# Patient Record
Sex: FEMALE | Race: OTHER | NOT HISPANIC OR LATINO | URBAN - METROPOLITAN AREA
[De-identification: names, ages, dates, MRNs, and addresses within clinical notes are randomized per-mention and may not be internally consistent; named-entity substitution may affect disease eponyms.]

---

## 2018-02-26 ENCOUNTER — EMERGENCY (EMERGENCY)
Facility: HOSPITAL | Age: 28
LOS: 1 days | Discharge: HOME | End: 2018-02-26
Attending: EMERGENCY MEDICINE | Admitting: EMERGENCY MEDICINE
Payer: COMMERCIAL

## 2018-02-26 VITALS
HEART RATE: 72 BPM | RESPIRATION RATE: 16 BRPM | TEMPERATURE: 98 F | DIASTOLIC BLOOD PRESSURE: 59 MMHG | SYSTOLIC BLOOD PRESSURE: 98 MMHG | OXYGEN SATURATION: 100 %

## 2018-02-26 VITALS
OXYGEN SATURATION: 100 % | RESPIRATION RATE: 17 BRPM | DIASTOLIC BLOOD PRESSURE: 79 MMHG | WEIGHT: 125 LBS | SYSTOLIC BLOOD PRESSURE: 108 MMHG | HEIGHT: 61 IN | HEART RATE: 83 BPM | TEMPERATURE: 98 F

## 2018-02-26 DIAGNOSIS — R42 DIZZINESS AND GIDDINESS: ICD-10-CM

## 2018-02-26 DIAGNOSIS — Z98.82 BREAST IMPLANT STATUS: Chronic | ICD-10-CM

## 2018-02-26 LAB
ALBUMIN SERPL ELPH-MCNC: 4.1 G/DL — SIGNIFICANT CHANGE UP (ref 3.3–5)
ALP SERPL-CCNC: 59 U/L — SIGNIFICANT CHANGE UP (ref 40–120)
ALT FLD-CCNC: 10 U/L RC — SIGNIFICANT CHANGE UP (ref 10–45)
ANION GAP SERPL CALC-SCNC: 8 MMOL/L — SIGNIFICANT CHANGE UP (ref 5–17)
APPEARANCE UR: ABNORMAL
AST SERPL-CCNC: 17 U/L — SIGNIFICANT CHANGE UP (ref 10–40)
BASOPHILS # BLD AUTO: 0 K/UL — SIGNIFICANT CHANGE UP (ref 0–0.2)
BASOPHILS NFR BLD AUTO: 0.5 % — SIGNIFICANT CHANGE UP (ref 0–2)
BILIRUB SERPL-MCNC: 0.2 MG/DL — SIGNIFICANT CHANGE UP (ref 0.2–1.2)
BILIRUB UR-MCNC: NEGATIVE — SIGNIFICANT CHANGE UP
BUN SERPL-MCNC: 14 MG/DL — SIGNIFICANT CHANGE UP (ref 7–23)
CALCIUM SERPL-MCNC: 9.1 MG/DL — SIGNIFICANT CHANGE UP (ref 8.4–10.5)
CHLORIDE SERPL-SCNC: 103 MMOL/L — SIGNIFICANT CHANGE UP (ref 96–108)
CO2 SERPL-SCNC: 28 MMOL/L — SIGNIFICANT CHANGE UP (ref 22–31)
COLOR SPEC: YELLOW — SIGNIFICANT CHANGE UP
CREAT SERPL-MCNC: 0.75 MG/DL — SIGNIFICANT CHANGE UP (ref 0.5–1.3)
DIFF PNL FLD: ABNORMAL
EOSINOPHIL # BLD AUTO: 0.4 K/UL — SIGNIFICANT CHANGE UP (ref 0–0.5)
EOSINOPHIL NFR BLD AUTO: 5.5 % — SIGNIFICANT CHANGE UP (ref 0–6)
GLUCOSE SERPL-MCNC: 98 MG/DL — SIGNIFICANT CHANGE UP (ref 70–99)
GLUCOSE UR QL: NEGATIVE — SIGNIFICANT CHANGE UP
HCG UR QL: NEGATIVE — SIGNIFICANT CHANGE UP
HCT VFR BLD CALC: 40.9 % — SIGNIFICANT CHANGE UP (ref 34.5–45)
HGB BLD-MCNC: 13.6 G/DL — SIGNIFICANT CHANGE UP (ref 11.5–15.5)
HIV 1 & 2 AB SERPL IA.RAPID: SIGNIFICANT CHANGE UP
KETONES UR-MCNC: NEGATIVE — SIGNIFICANT CHANGE UP
LEUKOCYTE ESTERASE UR-ACNC: ABNORMAL
LYMPHOCYTES # BLD AUTO: 2.7 K/UL — SIGNIFICANT CHANGE UP (ref 1–3.3)
LYMPHOCYTES # BLD AUTO: 33.9 % — SIGNIFICANT CHANGE UP (ref 13–44)
MAGNESIUM SERPL-MCNC: 1.9 MG/DL — SIGNIFICANT CHANGE UP (ref 1.6–2.6)
MCHC RBC-ENTMCNC: 31.4 PG — SIGNIFICANT CHANGE UP (ref 27–34)
MCHC RBC-ENTMCNC: 33.3 GM/DL — SIGNIFICANT CHANGE UP (ref 32–36)
MCV RBC AUTO: 94.3 FL — SIGNIFICANT CHANGE UP (ref 80–100)
MONOCYTES # BLD AUTO: 0.5 K/UL — SIGNIFICANT CHANGE UP (ref 0–0.9)
MONOCYTES NFR BLD AUTO: 6.5 % — SIGNIFICANT CHANGE UP (ref 2–14)
NEUTROPHILS # BLD AUTO: 4.3 K/UL — SIGNIFICANT CHANGE UP (ref 1.8–7.4)
NEUTROPHILS NFR BLD AUTO: 53.5 % — SIGNIFICANT CHANGE UP (ref 43–77)
NITRITE UR-MCNC: NEGATIVE — SIGNIFICANT CHANGE UP
PH UR: 6.5 — SIGNIFICANT CHANGE UP (ref 5–8)
PHOSPHATE SERPL-MCNC: 3.3 MG/DL — SIGNIFICANT CHANGE UP (ref 2.5–4.5)
PLATELET # BLD AUTO: 359 K/UL — SIGNIFICANT CHANGE UP (ref 150–400)
POTASSIUM SERPL-MCNC: 4.1 MMOL/L — SIGNIFICANT CHANGE UP (ref 3.5–5.3)
POTASSIUM SERPL-SCNC: 4.1 MMOL/L — SIGNIFICANT CHANGE UP (ref 3.5–5.3)
PROT SERPL-MCNC: 7.6 G/DL — SIGNIFICANT CHANGE UP (ref 6–8.3)
PROT UR-MCNC: SIGNIFICANT CHANGE UP
RBC # BLD: 4.34 M/UL — SIGNIFICANT CHANGE UP (ref 3.8–5.2)
RBC # FLD: 10.8 % — SIGNIFICANT CHANGE UP (ref 10.3–14.5)
RBC CASTS # UR COMP ASSIST: >50 /HPF (ref 0–2)
SODIUM SERPL-SCNC: 139 MMOL/L — SIGNIFICANT CHANGE UP (ref 135–145)
SP GR SPEC: 1.02 — SIGNIFICANT CHANGE UP (ref 1.01–1.02)
UROBILINOGEN FLD QL: NEGATIVE — SIGNIFICANT CHANGE UP
WBC # BLD: 8.1 K/UL — SIGNIFICANT CHANGE UP (ref 3.8–10.5)
WBC # FLD AUTO: 8.1 K/UL — SIGNIFICANT CHANGE UP (ref 3.8–10.5)
WBC UR QL: SIGNIFICANT CHANGE UP /HPF (ref 0–5)

## 2018-02-26 PROCEDURE — 81025 URINE PREGNANCY TEST: CPT

## 2018-02-26 PROCEDURE — 99284 EMERGENCY DEPT VISIT MOD MDM: CPT | Mod: 25

## 2018-02-26 PROCEDURE — 84100 ASSAY OF PHOSPHORUS: CPT

## 2018-02-26 PROCEDURE — 85027 COMPLETE CBC AUTOMATED: CPT

## 2018-02-26 PROCEDURE — 86703 HIV-1/HIV-2 1 RESULT ANTBDY: CPT

## 2018-02-26 PROCEDURE — 99284 EMERGENCY DEPT VISIT MOD MDM: CPT

## 2018-02-26 PROCEDURE — 81001 URINALYSIS AUTO W/SCOPE: CPT

## 2018-02-26 PROCEDURE — 83735 ASSAY OF MAGNESIUM: CPT

## 2018-02-26 PROCEDURE — 96374 THER/PROPH/DIAG INJ IV PUSH: CPT

## 2018-02-26 PROCEDURE — 80053 COMPREHEN METABOLIC PANEL: CPT

## 2018-02-26 RX ORDER — ONDANSETRON 8 MG/1
4 TABLET, FILM COATED ORAL ONCE
Qty: 0 | Refills: 0 | Status: COMPLETED | OUTPATIENT
Start: 2018-02-26 | End: 2018-02-26

## 2018-02-26 RX ORDER — FAMOTIDINE 10 MG/ML
1 INJECTION INTRAVENOUS
Qty: 0 | Refills: 0 | COMMUNITY

## 2018-02-26 RX ORDER — SODIUM CHLORIDE 9 MG/ML
1000 INJECTION INTRAMUSCULAR; INTRAVENOUS; SUBCUTANEOUS ONCE
Qty: 0 | Refills: 0 | Status: COMPLETED | OUTPATIENT
Start: 2018-02-26 | End: 2018-02-26

## 2018-02-26 RX ORDER — DIAZEPAM 5 MG
5 TABLET ORAL ONCE
Qty: 0 | Refills: 0 | Status: DISCONTINUED | OUTPATIENT
Start: 2018-02-26 | End: 2018-02-26

## 2018-02-26 RX ORDER — OFLOXACIN OTIC SOLUTION 3 MG/ML
10 SOLUTION/ DROPS AURICULAR (OTIC) ONCE
Qty: 0 | Refills: 0 | Status: COMPLETED | OUTPATIENT
Start: 2018-02-26 | End: 2018-02-26

## 2018-02-26 RX ORDER — DIAZEPAM 5 MG
1 TABLET ORAL
Qty: 8 | Refills: 0 | OUTPATIENT
Start: 2018-02-26 | End: 2018-03-01

## 2018-02-26 RX ADMIN — OFLOXACIN OTIC SOLUTION 10 DROP(S): 3 SOLUTION/ DROPS AURICULAR (OTIC) at 22:20

## 2018-02-26 RX ADMIN — Medication 5 MILLIGRAM(S): at 18:42

## 2018-02-26 RX ADMIN — SODIUM CHLORIDE 1000 MILLILITER(S): 9 INJECTION INTRAMUSCULAR; INTRAVENOUS; SUBCUTANEOUS at 18:43

## 2018-02-26 RX ADMIN — ONDANSETRON 4 MILLIGRAM(S): 8 TABLET, FILM COATED ORAL at 18:42

## 2018-02-26 NOTE — ED PROVIDER NOTE - OBJECTIVE STATEMENT
27F w/ pmh gastritis, constipation and recent dx of vertigo presents today with worsening dizziness, room spinning. Started 8 days ago. Last wed started vomiting and went to [facility] and dx with vertigo, sent home with meclizine and promethazine, which have not helped. Have been vomiting since wednesday multiple times a day. Vomiting is food, no blood or bile. 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 [facility] and told to go to ED for eval.     Of note, recent travel to [place] from 2/14 and 2/18. Felt pop in R ear during snorkeling at that time.     ROS neg for headaches, vision changes, tinnitus, fevers, night sweats, SOB, chest pain, abd pain, urinary symptoms.

## 2018-02-26 NOTE — ED ADULT NURSE NOTE - CHPI ED SYMPTOMS NEG
no weakness/no numbness/no change in level of consciousness/no blurred vision/no confusion/no fever/no loss of consciousness

## 2018-02-26 NOTE — ED ADULT NURSE NOTE - OBJECTIVE STATEMENT
Female 27 years old came in for dizziness. Pt reports she's been dizzy associated with nausea and vomiting for a week, went to Urgent care center and was given Meclizine and Promethazine. Pt went back to the Urgent care center for the second time after a few days because of worsening dizziness and was told to go to ER for MRI. Denies fever, chills, cough, sob, chest pain and urinary symptoms. PERRL. Denies sensitivity to light. Made comfortable. Will monitor. Female 27 years old came in for dizziness. Pt reports she's been dizzy associated with nausea and vomiting for a week, went to Urgent care center was diagnosed with Vertigo and was given Meclizine and Promethazine. Pt went back to the Urgent care center for the second time after a few days because of worsening dizziness and vomiting and was told to go to ER for MRI. Vomiting food, no bile nor blood. Denies fever, chills, cough, sob, chest pain and urinary symptoms. PERRL. Denies sensitivity to light. Pt recently came back from [place], and reports she heard a pop on her right ear while snorkeling. Made comfortable. Will monitor.

## 2018-02-26 NOTE — CONSULT NOTE ADULT - ASSESSMENT
26 yo F c/o right ear fullness, right muffled hearing and vertigo with associated n/v since she felt her ear 'pop' while snorkeling in [place] about two weeks ago. Pt was given meclizine and promethezine at urgent care in which she feels is making her more dizzy; feels better with lying down. Small TM perf, with bulging and erythema.

## 2018-02-26 NOTE — CONSULT NOTE ADULT - SUBJECTIVE AND OBJECTIVE BOX
Neurology Consult    Reason for consult: Vertigo    HPI: Patient is a 27 year old female presenting to the ED with vertigo. Patient states she went to [place] and went snorkeling 2/16. She went really deep in the water and heard her right ear pop. The next day she took a flight back to [place]. 2 days later she started having sensation of room spinning which was intermittent. Symptoms did not go away so she went to [facility] and she was given meclizine and told she had vertigo. She started developing ear pain, nausea, vomiting and on Friday she went again and was again told it was vertigo. States meclizine does not provide full relief. Denies changes in vision, double vision.    REVIEW OF SYSTEMS:  Constitutional: No fever, chills, fatigue, weakness.  Eyes: No eye pain, visual disturbances, or discharge.  ENT:  Vertigo, right ear pain. No sinus or throat pain.  Neck: No pain or stiffness.  Respiratory: No cough, dyspnea, wheezing.  Cardiovascular: No chest pain, palpitations.  Gastrointestinal: No abdominal pain. No nausea, vomiting, diarrhea, or constipation.   Genitourinary: No dysuria, frequency, hematuria or incontinence.  Neurological: No headaches, lightheadedness, vertigo, numbness or tremors.  Psychiatric: No depression, anxiety, mood swings, or difficulty sleeping.  Musculoskeletal: No joint pain or swelling. No muscle, back, or extremity pain.  Skin: No itching, burning, rashes or lesions.   Lymph Nodes: No enlarged glands  Endocrine: No heat or cold intolerance, no hair loss.  Allergy and Immunologic: No hives or eczema.    MEDICATIONS  Meclizine 25 TID    PMH: Gastritis  Pneumonia  Vertigo    PSH: H/O breast augmentation    FAMILY HISTORY:  No pertinent family history in first degree relatives  No history of dementia, strokes, or seizures     SOCIAL HISTORY:  No history of tobacco or alcohol use     Allergies  No Known Allergies    Vital Signs Last 24 Hrs  T(C): 36.6 (26 Feb 2018 17:14), Max: 36.6 (26 Feb 2018 17:14)  T(F): 97.8 (26 Feb 2018 17:14), Max: 97.8 (26 Feb 2018 17:14)  HR: 83 (26 Feb 2018 17:14) (83 - 83)  BP: 108/79 (26 Feb 2018 17:14) (108/79 - 108/79)  RR: 17 (26 Feb 2018 17:14) (17 - 17)  SpO2: 100% (26 Feb 2018 17:14) (100% - 100%)    Neurological Examination:    Mental Status: Patient is alert, awake, oriented X3. Patient is fluent, no dysarthria, no aphasia. Follows commands well and able to answer questions appropriately. Mood and affect normal.    Cranial Nerves: PERRL, EOMI, visual field intact, V1-V3 intact, no gross facial asymmetry, tongue/uvula midline. No nystagmus  Head thrust negative.  Right ear with possible tear on tympanic membrane  Right submandibular tenderness    Motor Exam: No drift  Right upper extremity: 5/5  Left upper extremity: 5/5  Right lower extremity: 5/5  Left lower extremity: 5/5    Normal bulk/tone    Sensory: Intact to light touch bilaterally. No extinction    Coordination: Finger to nose intact bilaterally     Gait: Slow and cautious but steady    Reflexes: Bilateral 2+ Biceps, Brachial, Patellar  Plantar: Down bilateral    GENERAL: No acute distress  HEENT:  Normocephalic, atraumatic  EXTREMITIES: No edema, clubbing, cyanosis  MUSCULOSKELETAL: Normal range of motion  SKIN: No rashes    LABS:  CBC Full  -  ( 26 Feb 2018 18:42 )  WBC Count : 8.1 K/uL  Hemoglobin : 13.6 g/dL  Hematocrit : 40.9 %  Platelet Count - Automated : 359 K/uL  Mean Cell Volume : 94.3 fl  Mean Cell Hemoglobin : 31.4 pg  Mean Cell Hemoglobin Concentration : 33.3 gm/dL  Auto Neutrophil # : 4.3 K/uL  Auto Lymphocyte # : 2.7 K/uL  Auto Monocyte # : 0.5 K/uL  Auto Eosinophil # : 0.4 K/uL  Auto Basophil # : 0.0 K/uL  Auto Neutrophil % : 53.5 %  Auto Lymphocyte % : 33.9 %  Auto Monocyte % : 6.5 %  Auto Eosinophil % : 5.5 %  Auto Basophil % : 0.5 %    02-26    139  |  103  |  14  ----------------------------<  98  4.1   |  28  |  0.75    Ca    9.1      26 Feb 2018 18:42  Phos  3.3     02-26  Mg     1.9     02-26    TPro  7.6  /  Alb  4.1  /  TBili  0.2  /  DBili  x   /  AST  17  /  ALT  10  /  AlkPhos  59  02-26    LIVER FUNCTIONS - ( 26 Feb 2018 18:42 )  Alb: 4.1 g/dL / Pro: 7.6 g/dL / ALK PHOS: 59 U/L / ALT: 10 U/L RC / AST: 17 U/L / GGT: x

## 2018-02-26 NOTE — ED PROVIDER NOTE - MEDICAL DECISION MAKING DETAILS
27F w/ a week of room spinning s/p vacation and snorkeling w/ ear popping. Feels unsteady on her feet. SYmptoms consistent with BPPV however pt not responding to meclazine and promethazine. Given travel hx and pressure changes, certainly can still be peripheral. WIll attempt symptom mgmt with valium, zofran, and fluids. Will likely benefit from imaging of ear and brain. However, may need MRI. Will consult both neuro and ENT prior to deciding on imaging. 27F w/ a week of room spinning s/p vacation and snorkeling w/ ear popping. Feels unsteady on her feet. SYmptoms consistent with BPPV however pt not responding to meclazine and promethazine. Given travel hx and pressure changes, certainly can still be peripheral. Slight TM bulging on the R with small inflammation around the TM. WIll attempt symptom mgmt with valium, zofran, and fluids. Will likely benefit from imaging of ear and brain. However, may need MRI. Will consult both neuro and ENT prior to deciding on imaging.

## 2018-02-26 NOTE — CONSULT NOTE ADULT - SUBJECTIVE AND OBJECTIVE BOX
CC: Dizziness    HPI: Patient is a 27y old female c/o dizziness with associated nausea and vomiting since snorkeling in [place] when she felt her ear 'pop' abouot two weeks ago. Pt first began vomiting this past wednesday and was seen by urgent care and given meclizine and promethezine which she feels hasn't helped, and actually makes her more dizzy. Pt states she feels as if the room is spinning when she stands up and moves around, or when her eyes are closed and feels better while laying still. Pt denies hearing loss or tinnitus, but feel as if her right ear is muffled with fullness. Pt denies otorrhea, ear pain, fever, abdominal pain, sore throat.    PAST MEDICAL & SURGICAL HISTORY:  Gastritis  Pneumonia  Vertigo  H/O breast augmentation    Allergies    No Known Allergies    Intolerances      MEDICATIONS  (STANDING):    MEDICATIONS  (PRN):    Social History: never smoker    ROS: ENT, GI, , CV, Pulm, Neuro, Psych, MS, Heme, Endo, Constitutional; all negative except as noted in HPI    Vital Signs Last 24 Hrs  T(C): 36.6 (26 Feb 2018 17:14), Max: 36.6 (26 Feb 2018 17:14)  T(F): 97.8 (26 Feb 2018 17:14), Max: 97.8 (26 Feb 2018 17:14)  HR: 83 (26 Feb 2018 17:14) (83 - 83)  BP: 108/79 (26 Feb 2018 17:14) (108/79 - 108/79)  BP(mean): --  RR: 17 (26 Feb 2018 17:14) (17 - 17)  SpO2: 100% (26 Feb 2018 17:14) (100% - 100%)                          13.6   8.1   )-----------( 359      ( 26 Feb 2018 18:42 )             40.9    02-26    139  |  103  |  14  ----------------------------<  98  4.1   |  28  |  0.75    Ca    9.1      26 Feb 2018 18:42  Phos  3.3     02-26  Mg     1.9     02-26    TPro  7.6  /  Alb  4.1  /  TBili  0.2  /  DBili  x   /  AST  17  /  ALT  10  /  AlkPhos  59  02-26       PHYSICAL EXAM:  Gen: NAD, well-developed  Head: Normocephalic, Atraumatic  Face: no edema/erythema/fluctuance, parotid glands soft without mass  Eyes: PERRL, EOMI, no scleral injection  Ears: Right - Bulging erythematous TM with small perforation in the center, no drainage, no blood, ear canal clear            Left - ear canal clear, TM intact without effusion  Nose: Nares bilaterally patent, no discharge  Mouth: Mucosa moist, tongue/uvula midline, oropharynx clear  Neck: Flat, supple, no lymphadenopathy, trachea midline, no masses,  R infrauricular tenderness to palpation  Resp: no use of accessory muscles, no stridor  CV: no peripheral edema/cyanosis

## 2018-02-26 NOTE — ED ADULT TRIAGE NOTE - CHIEF COMPLAINT QUOTE
Dizziness/nausea x 8 days, seen at urgent care and dx with vertigo and given meclizine and promethazine with no relief

## 2018-02-26 NOTE — CONSULT NOTE ADULT - ASSESSMENT
27 year old female presenting to the ED with vertigo. Patient states she went to [place] and went snorkeling 2/16. She went really deep in the water and heard her right ear pop. The next day she took a flight back to [place]. 2 days later she started having sensation of room spinning which was intermittent. Symptoms did not go away so she went to [facility] and she was given meclizine and told she had vertigo. She started developing ear pain, nausea, vomiting and on Friday she went again and was again told it was vertigo. States meclizine does not provide full relief.  Neuro exam nonfocal. Possible tear on right tympanic membrane with tenderness to palpation of right submandibular area.    Peripheral vertigo    Recommend:  ENT evaluation  Meclizine 25 PO TID  Valium 5 mg q6h PRN  Reglan  IV fluids  Outpatient follow up with Dr. Miranda [phone]

## 2018-02-26 NOTE — ED PROVIDER NOTE - ATTENDING CONTRIBUTION TO CARE
Dory Cannon MD  27F w/ a week of room spinning s/p vacation and snorkeling w/ ear popping. Feels unsteady on her feet. not responding to meclazine and promethazine; on exam, LAVON, EOMI, Slight TM bulging on the R with small inflammation around the TM, no nystagmus, no ataxia, normal strength. Plan: valium, zofran, and fluids both neuro and ENT.

## 2018-03-01 RX ORDER — OFLOXACIN OTIC SOLUTION 3 MG/ML
10 SOLUTION/ DROPS AURICULAR (OTIC)
Qty: 1 | Refills: 0 | OUTPATIENT
Start: 2018-03-01 | End: 2018-03-10

## 2018-03-01 NOTE — ED POST DISCHARGE NOTE - REASON FOR FOLLOW-UP
Other pt called stating her ear drops were not sent into her pharmacy. reviewed pts chart, d/c note and ENT note recommend ofloxacin drops, rx sent to pts requested pharmacy. - LAUREEN Sadler

## 2019-02-27 ENCOUNTER — EMERGENCY (EMERGENCY)
Facility: HOSPITAL | Age: 29
LOS: 1 days | Discharge: HOME | End: 2019-02-27
Attending: EMERGENCY MEDICINE
Payer: COMMERCIAL

## 2019-02-27 VITALS
DIASTOLIC BLOOD PRESSURE: 75 MMHG | WEIGHT: 121.92 LBS | TEMPERATURE: 98 F | HEART RATE: 87 BPM | SYSTOLIC BLOOD PRESSURE: 113 MMHG | OXYGEN SATURATION: 99 % | RESPIRATION RATE: 18 BRPM

## 2019-02-27 DIAGNOSIS — Z98.82 BREAST IMPLANT STATUS: Chronic | ICD-10-CM

## 2019-02-27 PROCEDURE — 99283 EMERGENCY DEPT VISIT LOW MDM: CPT | Mod: 25

## 2019-02-27 NOTE — ED ADULT NURSE NOTE - OBJECTIVE STATEMENT
29 y/o female presents to ED c/o R ear pain x few days. Pt denies drainage. Saw PCP who didn't find anything of significance. Pt took 2 aleve with no relief. Upon arrival, pt tearful. Denies fever, chills, n/v/d.

## 2019-02-27 NOTE — ED ADULT NURSE NOTE - NSIMPLEMENTINTERV_GEN_ALL_ED
Implemented All Universal Safety Interventions:  [place] to call system. Call bell, personal items and telephone within reach. Instruct patient to call for assistance. Room bathroom lighting operational. Non-slip footwear when patient is off stretcher. Physically safe environment: no spills, clutter or unnecessary equipment. Stretcher in lowest position, wheels locked, appropriate side rails in place.

## 2019-02-28 PROBLEM — J18.9 PNEUMONIA, UNSPECIFIED ORGANISM: Chronic | Status: ACTIVE | Noted: 2018-02-26

## 2019-02-28 PROBLEM — K29.70 GASTRITIS, UNSPECIFIED, WITHOUT BLEEDING: Chronic | Status: ACTIVE | Noted: 2018-02-26

## 2019-02-28 PROBLEM — R42 DIZZINESS AND GIDDINESS: Chronic | Status: ACTIVE | Noted: 2018-02-26

## 2019-02-28 PROCEDURE — 99283 EMERGENCY DEPT VISIT LOW MDM: CPT

## 2019-02-28 RX ORDER — ACETAMINOPHEN 500 MG
650 TABLET ORAL ONCE
Qty: 0 | Refills: 0 | Status: COMPLETED | OUTPATIENT
Start: 2019-02-28 | End: 2019-02-28

## 2019-02-28 RX ADMIN — Medication 1 TABLET(S): at 00:12

## 2019-02-28 RX ADMIN — Medication 650 MILLIGRAM(S): at 00:12

## 2019-02-28 NOTE — ED PROVIDER NOTE - OBJECTIVE STATEMENT
27yo F with pmhx of constipation, vertigo, GERD presents to ER c/o of right ear pain associated with sore throat, nasal congestion and subjective fevers x1week.  Patient reports when ever she "get sick" -congested, gets ear pain, saw her ENT doctor 4days ago who told her she did not have an infection and it was probably GERD symptoms causing pain.  Reports symptoms has been getting worse, reports constant pressure and "clogged sensation" to right ear, worse when swallowing.  Tried taking allieve for pain with minimal relief.  Denies sick contacts, recent travel, trauma to ear, cough, bodyaches, vomiting, visual changes, neck stiffness, 27yo F with pmhx of constipation, vertigo, GERD presents to ER c/o of right ear pain associated with sore throat, nasal congestion and subjective fevers x1week.  Patient reports when ever she "get sick" -congested, gets ear pain, saw her ENT doctor 4days ago who told her she did not have an infection and it was probably GERD symptoms causing pain.  Reports symptoms has been getting worse, reports constant pressure and "clogged sensation" to right ear, worse when swallowing.  Tried taking allieve for pain with minimal relief.  Denies sick contacts, recent travel, trauma to ear, cough, bodyaches, vomiting, visual changes, neck stiffness,      Attending note. Patient was seen in fast track from #3. Agree with the above. Patient's complaint of severe right ear pain without fever or discharge. Patient has pain for approximately one week and was evaluated by ENT. Patient also complains of slight decreased hearing in the right ear. She denies any sore throat, dizziness/vertigo. She reports some nasal congestion, but no cough.

## 2019-02-28 NOTE — ED PROVIDER NOTE - CLINICAL SUMMARY MEDICAL DECISION MAKING FREE TEXT BOX
28yo F c/o of nasal congestion, sore throat ear pain.  imP; right OM, URI  -tylenol and ABX, reassess 26yo F c/o of nasal congestion, sore throat ear pain.  imP; right OM, URI  -tylenol and ABX, reassess      Attending note. Right otitis media. Antibiotics, analgesia, follow up with her ENT in one week.

## 2019-02-28 NOTE — ED PROVIDER NOTE - NSFOLLOWUPINSTRUCTIONS_ED_ALL_ED_FT
Take antibiotics as directed.  Tylenol 650mg every 6-8hours as needed for fevers/pain.  Continue to follow up with ENT.  Return to ER for any worsening or concerning symptoms

## 2019-02-28 NOTE — ED PROVIDER NOTE - CARE PLAN
Principal Discharge DX:	Ear pain Principal Discharge DX:	Other non-recurrent acute nonsuppurative otitis media of right ear

## 2019-02-28 NOTE — ED PROVIDER NOTE - PHYSICAL EXAMINATION
Attending note. Patient is alert and in moderate distress. There is no facial swelling. Oropharynx is normal. Patient has erythema and bulging of the right TM. Tympanic membrane is intact. There is no submandibular or anterior cervical adenopathy.

## 2019-10-12 NOTE — ED PROVIDER NOTE - NS HIV RISK FACTOR YES
Declined Nasal mucosa clear.  Mouth with normal mucosa  Throat has no vesicles, no oropharyngeal exudates and uvula is midline.

## 2020-01-02 ENCOUNTER — EMERGENCY (EMERGENCY)
Facility: HOSPITAL | Age: 30
LOS: 1 days | Discharge: HOME | End: 2020-01-02
Attending: EMERGENCY MEDICINE
Payer: COMMERCIAL

## 2020-01-02 VITALS
TEMPERATURE: 98 F | OXYGEN SATURATION: 100 % | WEIGHT: 123.9 LBS | HEIGHT: 62 IN | SYSTOLIC BLOOD PRESSURE: 113 MMHG | DIASTOLIC BLOOD PRESSURE: 79 MMHG | HEART RATE: 80 BPM | RESPIRATION RATE: 17 BRPM

## 2020-01-02 DIAGNOSIS — Z98.82 BREAST IMPLANT STATUS: Chronic | ICD-10-CM

## 2020-01-02 PROCEDURE — 93010 ELECTROCARDIOGRAM REPORT: CPT

## 2020-01-02 PROCEDURE — 99284 EMERGENCY DEPT VISIT MOD MDM: CPT

## 2020-01-02 NOTE — ED ADULT TRIAGE NOTE - CHIEF COMPLAINT QUOTE
Patient c/o chest pressure and palpitations since Tuesday morning. Patient went to a Urgent care on Tuesday ( EKG and FS done WNL)

## 2020-01-03 VITALS
HEART RATE: 70 BPM | TEMPERATURE: 98 F | DIASTOLIC BLOOD PRESSURE: 73 MMHG | SYSTOLIC BLOOD PRESSURE: 107 MMHG | OXYGEN SATURATION: 99 % | RESPIRATION RATE: 18 BRPM

## 2020-01-03 LAB
ALBUMIN SERPL ELPH-MCNC: 3.9 G/DL — SIGNIFICANT CHANGE UP (ref 3.3–5)
ALP SERPL-CCNC: 48 U/L — SIGNIFICANT CHANGE UP (ref 40–120)
ALT FLD-CCNC: 17 U/L — SIGNIFICANT CHANGE UP (ref 10–45)
ANION GAP SERPL CALC-SCNC: 14 MMOL/L — SIGNIFICANT CHANGE UP (ref 5–17)
AST SERPL-CCNC: 18 U/L — SIGNIFICANT CHANGE UP (ref 10–40)
BASOPHILS # BLD AUTO: 0.06 K/UL — SIGNIFICANT CHANGE UP (ref 0–0.2)
BASOPHILS NFR BLD AUTO: 0.7 % — SIGNIFICANT CHANGE UP (ref 0–2)
BILIRUB SERPL-MCNC: 0.1 MG/DL — LOW (ref 0.2–1.2)
BUN SERPL-MCNC: 16 MG/DL — SIGNIFICANT CHANGE UP (ref 7–23)
CALCIUM SERPL-MCNC: 9.2 MG/DL — SIGNIFICANT CHANGE UP (ref 8.4–10.5)
CHLORIDE SERPL-SCNC: 103 MMOL/L — SIGNIFICANT CHANGE UP (ref 96–108)
CO2 SERPL-SCNC: 22 MMOL/L — SIGNIFICANT CHANGE UP (ref 22–31)
CREAT SERPL-MCNC: 0.6 MG/DL — SIGNIFICANT CHANGE UP (ref 0.5–1.3)
EOSINOPHIL # BLD AUTO: 0.55 K/UL — HIGH (ref 0–0.5)
EOSINOPHIL NFR BLD AUTO: 6.2 % — HIGH (ref 0–6)
GLUCOSE SERPL-MCNC: 105 MG/DL — HIGH (ref 70–99)
HCG SERPL-ACNC: <2 MIU/ML — SIGNIFICANT CHANGE UP
HCT VFR BLD CALC: 38.5 % — SIGNIFICANT CHANGE UP (ref 34.5–45)
HGB BLD-MCNC: 12.7 G/DL — SIGNIFICANT CHANGE UP (ref 11.5–15.5)
IMM GRANULOCYTES NFR BLD AUTO: 0.1 % — SIGNIFICANT CHANGE UP (ref 0–1.5)
LYMPHOCYTES # BLD AUTO: 3.5 K/UL — HIGH (ref 1–3.3)
LYMPHOCYTES # BLD AUTO: 39.5 % — SIGNIFICANT CHANGE UP (ref 13–44)
MCHC RBC-ENTMCNC: 30.8 PG — SIGNIFICANT CHANGE UP (ref 27–34)
MCHC RBC-ENTMCNC: 33 GM/DL — SIGNIFICANT CHANGE UP (ref 32–36)
MCV RBC AUTO: 93.4 FL — SIGNIFICANT CHANGE UP (ref 80–100)
MONOCYTES # BLD AUTO: 0.64 K/UL — SIGNIFICANT CHANGE UP (ref 0–0.9)
MONOCYTES NFR BLD AUTO: 7.2 % — SIGNIFICANT CHANGE UP (ref 2–14)
NEUTROPHILS # BLD AUTO: 4.11 K/UL — SIGNIFICANT CHANGE UP (ref 1.8–7.4)
NEUTROPHILS NFR BLD AUTO: 46.3 % — SIGNIFICANT CHANGE UP (ref 43–77)
NRBC # BLD: 0 /100 WBCS — SIGNIFICANT CHANGE UP (ref 0–0)
PLATELET # BLD AUTO: 325 K/UL — SIGNIFICANT CHANGE UP (ref 150–400)
POTASSIUM SERPL-MCNC: 4 MMOL/L — SIGNIFICANT CHANGE UP (ref 3.5–5.3)
POTASSIUM SERPL-SCNC: 4 MMOL/L — SIGNIFICANT CHANGE UP (ref 3.5–5.3)
PROT SERPL-MCNC: 6.8 G/DL — SIGNIFICANT CHANGE UP (ref 6–8.3)
RBC # BLD: 4.12 M/UL — SIGNIFICANT CHANGE UP (ref 3.8–5.2)
RBC # FLD: 12 % — SIGNIFICANT CHANGE UP (ref 10.3–14.5)
SODIUM SERPL-SCNC: 139 MMOL/L — SIGNIFICANT CHANGE UP (ref 135–145)
WBC # BLD: 8.87 K/UL — SIGNIFICANT CHANGE UP (ref 3.8–10.5)
WBC # FLD AUTO: 8.87 K/UL — SIGNIFICANT CHANGE UP (ref 3.8–10.5)

## 2020-01-03 PROCEDURE — 71045 X-RAY EXAM CHEST 1 VIEW: CPT

## 2020-01-03 PROCEDURE — 85027 COMPLETE CBC AUTOMATED: CPT

## 2020-01-03 PROCEDURE — 71045 X-RAY EXAM CHEST 1 VIEW: CPT | Mod: 26

## 2020-01-03 PROCEDURE — 84702 CHORIONIC GONADOTROPIN TEST: CPT

## 2020-01-03 PROCEDURE — 93005 ELECTROCARDIOGRAM TRACING: CPT

## 2020-01-03 PROCEDURE — 99283 EMERGENCY DEPT VISIT LOW MDM: CPT | Mod: 25

## 2020-01-03 PROCEDURE — 80053 COMPREHEN METABOLIC PANEL: CPT

## 2020-01-03 RX ORDER — IBUPROFEN 200 MG
400 TABLET ORAL ONCE
Refills: 0 | Status: COMPLETED | OUTPATIENT
Start: 2020-01-03 | End: 2020-01-03

## 2020-01-03 RX ADMIN — Medication 400 MILLIGRAM(S): at 04:18

## 2020-01-03 NOTE — ED PROVIDER NOTE - PATIENT PORTAL LINK FT
You can access the FollowMyHealth Patient Portal offered by [facility] by registering at the following website: [email or web address]. By joining [employer]’s FollowMyHealth portal, you will also be able to view your health information using other applications (apps) compatible with our system.

## 2020-01-03 NOTE — ED PROVIDER NOTE - NSFOLLOWUPINSTRUCTIONS_ED_ALL_ED_FT
You came into the hospital for chest pain. It was determined on testing that you do not have a life-threatening cause for your chest pain, although the exact reason why you are experiencing this is unclear.     You may follow up with your outpatient provider in the next 1-2 weeks regarding this matter to see if further testing/imaging is required.    Please return to the emergency department if you feel like you have palpitations that do not get better, with associated light-headedness/dizziness, worsening chest pain that does not resolve with ibuprofen/tylenol, or persistent shortness of breath.    You may take ibuprofen 400mg every 4-6 hours with meals as needed if you continue to have mild-moderate chest pain.

## 2020-01-03 NOTE — ED PROVIDER NOTE - CARE PLAN
Principal Discharge DX:	Chest pain  Assessment and plan of treatment:	Young woman with chest pressure for 3-4 days, with associated lightheadedness/dizziness, will send basic labs, EKG, imaging; ddx includes pericarditis, costochondritis, ACS unlikely, PE unlikely, treat with ibuprofen and re-assess

## 2020-01-03 NOTE — ED PROVIDER NOTE - OBJECTIVE STATEMENT
Pt is an otherwise healthy 29F complaining of palpitations since tuesday. Palpitations lasted for a short period of time and self-resolved. During the episode of palpitations, she had associated light-headedness/dizziness mild chest discomfort/pressure. The chest discomfort was epigastric in nature, radiating upwards, described as a pressure, and continued throughout presentation to ED. She went to urgent care for these symptoms and was told to come into ER for further evaluation.    She otherwise denies any current fever, chills, nausea, vomiting, shortness of breath, abdominal pain, diarrhea, constipation, or dysuria.

## 2020-01-03 NOTE — ED PROVIDER NOTE - ATTENDING CONTRIBUTION TO CARE
CXR neg, labs unremarkable. Pt PERC neg. EKG without acute pathology. no real concern for ACS. pericarditis unlikely. Multiple diagnoses were considered during patient's evaluation today. While exact etiology of symptoms is unclear, there appears to be no emergent process today that would require further emergent or inpatient management. Pt is safe for dc with outpt f/u and return instructions if symptoms worsen.

## 2020-01-03 NOTE — ED PROVIDER NOTE - PHYSICAL EXAMINATION
T(C): 36.6 (01-03-20 @ 05:18), Max: 36.6 (01-02-20 @ 21:46)  HR: 70 (01-03-20 @ 05:18) (70 - 80)  BP: 107/73 (01-03-20 @ 05:18) (107/73 - 113/79)  RR: 18 (01-03-20 @ 05:18) (17 - 18)  SpO2: 99% (01-03-20 @ 05:18) (99% - 100%)    PHYSICAL EXAM:  General: Female laying in bed comfortably  HEENT: NCAT, no conjunctival pallor, no scleral icterus, moist mucous membranes, no oropharyngeal exudates or erythema  Neck: Supple, no lymphadenopathy  Pulmonary: Clear to auscultation bilaterally, no wheezing, rales, or ronchi  Cardiovascular: Regular rate and rhythm, normal S1/S2, no murmurs, rubs, or gallops, no reproducible tenderness on palpation of the sterum  Abdominal Exam: Soft, NTND, no masses, bowel sounds present  Extremities: No edema, pulses 2+ bilaterally in the upper and lower extremities  Neuro: Grossly intact in all extremities  Skin: Warm, dry, no visible jaundice, no rashes

## 2020-01-03 NOTE — ED ADULT NURSE NOTE - OBJECTIVE STATEMENT
29 year old Female, no PMH/PSH. Patient went to Urgent care for palpitations. Comes to ED after palpitations on Tuesday have not subsided. Patient feels shortness of breath despite O2 100% on room air. Complains of sternal chest pain described as pressure, with no radition. Taking a deep breath makes the pain better, nothing makes the pain worse. Patient has not taken any medication for the pain. Patient A&Ox3, breathing spontaneous and unlabored, palpable pulses, able to move all extremities. Denies recent travel or long cars, dizziness, vision changes, nausea, vomiting, diarrhea, fever, cough, chills, urinary symptoms. Patient is on birth control, states there has been no complications with LMP one week ago, cycle as usual for patient. Boyfriend at beside. Bed locked and in lowest position with side rails up for safety.

## 2020-01-03 NOTE — ED PROVIDER NOTE - PLAN OF CARE
Young woman with chest pressure for 3-4 days, with associated lightheadedness/dizziness, will send basic labs, EKG, imaging; ddx includes pericarditis, costochondritis, ACS unlikely, PE unlikely, treat with ibuprofen and re-assess

## 2022-06-23 NOTE — ED ADULT NURSE NOTE - CHIEF COMPLAINT QUOTE
Patient c/o chest pressure and palpitations since Tuesday morning. Patient went to a Urgent care on Tuesday ( EKG and FS done WNL)
EKG completed

## 2024-11-08 NOTE — CONSULT NOTE ADULT - PROBLEM SELECTOR RECOMMENDATION 9
Meclizine 25 PO TID  Ofloxacin otic gtts 10 gtt in affected ear qd x 10 days  f/u with dr. Smalls in 7 days [phone] hand

## 2024-11-26 PROBLEM — Z00.00 ENCOUNTER FOR PREVENTIVE HEALTH EXAMINATION: Status: ACTIVE | Noted: 2024-11-26

## 2024-11-29 ENCOUNTER — APPOINTMENT (OUTPATIENT)
Dept: GASTROENTEROLOGY | Facility: CLINIC | Age: 34
End: 2024-11-29
Payer: COMMERCIAL

## 2024-11-29 VITALS
WEIGHT: 124 LBS | DIASTOLIC BLOOD PRESSURE: 70 MMHG | RESPIRATION RATE: 14 BRPM | HEIGHT: 62 IN | OXYGEN SATURATION: 99 % | HEART RATE: 89 BPM | SYSTOLIC BLOOD PRESSURE: 104 MMHG | TEMPERATURE: 98 F | BODY MASS INDEX: 22.82 KG/M2

## 2024-11-29 DIAGNOSIS — Z78.9 OTHER SPECIFIED HEALTH STATUS: ICD-10-CM

## 2024-11-29 DIAGNOSIS — K76.89 OTHER SPECIFIED DISEASES OF LIVER: ICD-10-CM

## 2024-11-29 DIAGNOSIS — K59.09 OTHER CONSTIPATION: ICD-10-CM

## 2024-11-29 DIAGNOSIS — K52.9 NONINFECTIVE GASTROENTERITIS AND COLITIS, UNSPECIFIED: ICD-10-CM

## 2024-11-29 PROCEDURE — 99203 OFFICE O/P NEW LOW 30 MIN: CPT

## 2024-11-29 RX ORDER — LINACLOTIDE 72 UG/1
72 CAPSULE, GELATIN COATED ORAL
Qty: 30 | Refills: 4 | Status: ACTIVE | COMMUNITY
Start: 2024-11-29 | End: 1900-01-01